# Patient Record
Sex: FEMALE | Race: WHITE
[De-identification: names, ages, dates, MRNs, and addresses within clinical notes are randomized per-mention and may not be internally consistent; named-entity substitution may affect disease eponyms.]

---

## 2017-05-30 ENCOUNTER — HOSPITAL ENCOUNTER (OUTPATIENT)
Dept: HOSPITAL 62 - ER | Age: 47
Setting detail: OBSERVATION
LOS: 1 days | Discharge: HOME | End: 2017-05-31
Attending: FAMILY MEDICINE | Admitting: FAMILY MEDICINE
Payer: COMMERCIAL

## 2017-05-30 DIAGNOSIS — F10.10: ICD-10-CM

## 2017-05-30 DIAGNOSIS — Z85.828: ICD-10-CM

## 2017-05-30 DIAGNOSIS — R11.2: ICD-10-CM

## 2017-05-30 DIAGNOSIS — Z80.9: ICD-10-CM

## 2017-05-30 DIAGNOSIS — R19.7: ICD-10-CM

## 2017-05-30 DIAGNOSIS — E87.1: Primary | ICD-10-CM

## 2017-05-30 DIAGNOSIS — F14.90: ICD-10-CM

## 2017-05-30 DIAGNOSIS — E87.6: ICD-10-CM

## 2017-05-30 DIAGNOSIS — F17.290: ICD-10-CM

## 2017-05-30 DIAGNOSIS — Z87.891: ICD-10-CM

## 2017-05-30 DIAGNOSIS — R79.89: ICD-10-CM

## 2017-05-30 DIAGNOSIS — D64.9: ICD-10-CM

## 2017-05-30 DIAGNOSIS — Z83.79: ICD-10-CM

## 2017-05-30 DIAGNOSIS — R91.8: ICD-10-CM

## 2017-05-30 LAB
ALBUMIN SERPL-MCNC: 3.6 G/DL (ref 3.5–5)
ALP SERPL-CCNC: 49 U/L (ref 38–126)
ALT SERPL-CCNC: 42 U/L (ref 9–52)
ANION GAP SERPL CALC-SCNC: 11 MMOL/L (ref 5–19)
AST SERPL-CCNC: 43 U/L (ref 14–36)
BASOPHILS # BLD AUTO: 0.1 10^3/UL (ref 0–0.2)
BASOPHILS NFR BLD AUTO: 0.4 % (ref 0–2)
BILIRUB DIRECT SERPL-MCNC: 0.2 MG/DL (ref 0–0.4)
BILIRUB SERPL-MCNC: 0.6 MG/DL (ref 0.2–1.3)
BUN SERPL-MCNC: 4 MG/DL (ref 7–20)
CALCIUM: 8 MG/DL (ref 8.4–10.2)
CHLORIDE SERPL-SCNC: 84 MMOL/L (ref 98–107)
CO2 SERPL-SCNC: 23 MMOL/L (ref 22–30)
CREAT SERPL-MCNC: 0.62 MG/DL (ref 0.52–1.25)
EOSINOPHIL # BLD AUTO: 0.1 10^3/UL (ref 0–0.6)
EOSINOPHIL NFR BLD AUTO: 0.8 % (ref 0–6)
ERYTHROCYTE [DISTWIDTH] IN BLOOD BY AUTOMATED COUNT: 17.3 % (ref 11.5–14)
ETHANOL SERPL-MCNC: < 10 MG/DL
GLUCOSE SERPL-MCNC: 79 MG/DL (ref 75–110)
HCT VFR BLD CALC: 28.6 % (ref 36–47)
HGB BLD-MCNC: 8.9 G/DL (ref 12–15.5)
HGB HCT DIFFERENCE: -1.9
LIPASE SERPL-CCNC: 69 U/L (ref 23–300)
LYMPHOCYTES # BLD AUTO: 1.1 10^3/UL (ref 0.5–4.7)
LYMPHOCYTES NFR BLD AUTO: 8.8 % (ref 13–45)
MCH RBC QN AUTO: 21.2 PG (ref 27–33.4)
MCHC RBC AUTO-ENTMCNC: 31.3 G/DL (ref 32–36)
MCV RBC AUTO: 68 FL (ref 80–97)
MONOCYTES # BLD AUTO: 1.2 10^3/UL (ref 0.1–1.4)
MONOCYTES NFR BLD AUTO: 9.3 % (ref 3–13)
NEUTROPHILS # BLD AUTO: 10.5 10^3/UL (ref 1.7–8.2)
NEUTS SEG NFR BLD AUTO: 80.7 % (ref 42–78)
POTASSIUM SERPL-SCNC: 3.5 MMOL/L (ref 3.6–5)
PROT SERPL-MCNC: 6 G/DL (ref 6.3–8.2)
RBC # BLD AUTO: 4.23 10^6/UL (ref 3.72–5.28)
SODIUM SERPL-SCNC: 118 MMOL/L (ref 137–145)
WBC # BLD AUTO: 13 10^3/UL (ref 4–10.5)

## 2017-05-30 PROCEDURE — 83540 ASSAY OF IRON: CPT

## 2017-05-30 PROCEDURE — 85025 COMPLETE CBC W/AUTO DIFF WBC: CPT

## 2017-05-30 PROCEDURE — 83930 ASSAY OF BLOOD OSMOLALITY: CPT

## 2017-05-30 PROCEDURE — 84300 ASSAY OF URINE SODIUM: CPT

## 2017-05-30 PROCEDURE — 84703 CHORIONIC GONADOTROPIN ASSAY: CPT

## 2017-05-30 PROCEDURE — 85045 AUTOMATED RETICULOCYTE COUNT: CPT

## 2017-05-30 PROCEDURE — G0378 HOSPITAL OBSERVATION PER HR: HCPCS

## 2017-05-30 PROCEDURE — 83655 ASSAY OF LEAD: CPT

## 2017-05-30 PROCEDURE — 93010 ELECTROCARDIOGRAM REPORT: CPT

## 2017-05-30 PROCEDURE — 80048 BASIC METABOLIC PNL TOTAL CA: CPT

## 2017-05-30 PROCEDURE — 85730 THROMBOPLASTIN TIME PARTIAL: CPT

## 2017-05-30 PROCEDURE — 83935 ASSAY OF URINE OSMOLALITY: CPT

## 2017-05-30 PROCEDURE — 84443 ASSAY THYROID STIM HORMONE: CPT

## 2017-05-30 PROCEDURE — 85610 PROTHROMBIN TIME: CPT

## 2017-05-30 PROCEDURE — 84466 ASSAY OF TRANSFERRIN: CPT

## 2017-05-30 PROCEDURE — 83550 IRON BINDING TEST: CPT

## 2017-05-30 PROCEDURE — 96375 TX/PRO/DX INJ NEW DRUG ADDON: CPT

## 2017-05-30 PROCEDURE — 74177 CT ABD & PELVIS W/CONTRAST: CPT

## 2017-05-30 PROCEDURE — 82272 OCCULT BLD FECES 1-3 TESTS: CPT

## 2017-05-30 PROCEDURE — 93005 ELECTROCARDIOGRAM TRACING: CPT

## 2017-05-30 PROCEDURE — 80307 DRUG TEST PRSMV CHEM ANLYZR: CPT

## 2017-05-30 PROCEDURE — 81001 URINALYSIS AUTO W/SCOPE: CPT

## 2017-05-30 PROCEDURE — 87086 URINE CULTURE/COLONY COUNT: CPT

## 2017-05-30 PROCEDURE — 82746 ASSAY OF FOLIC ACID SERUM: CPT

## 2017-05-30 PROCEDURE — 83735 ASSAY OF MAGNESIUM: CPT

## 2017-05-30 PROCEDURE — 82728 ASSAY OF FERRITIN: CPT

## 2017-05-30 PROCEDURE — 94799 UNLISTED PULMONARY SVC/PX: CPT

## 2017-05-30 PROCEDURE — 83690 ASSAY OF LIPASE: CPT

## 2017-05-30 PROCEDURE — 96365 THER/PROPH/DIAG IV INF INIT: CPT

## 2017-05-30 PROCEDURE — 80053 COMPREHEN METABOLIC PANEL: CPT

## 2017-05-30 PROCEDURE — 99285 EMERGENCY DEPT VISIT HI MDM: CPT

## 2017-05-30 PROCEDURE — 82607 VITAMIN B-12: CPT

## 2017-05-30 PROCEDURE — 36415 COLL VENOUS BLD VENIPUNCTURE: CPT

## 2017-05-30 PROCEDURE — 96361 HYDRATE IV INFUSION ADD-ON: CPT

## 2017-05-30 NOTE — ER DOCUMENT REPORT
ED GI/





- General


Chief Complaint: Dizziness


Stated Complaint: DIZZY/NAUSEA/VOMITING


Time Seen by Provider: 17 22:05


Mode of Arrival: Ambulatory


Information source: Patient


TRAVEL OUTSIDE OF THE [place] IN LAST 30 DAYS: No





- HPI


Patient complains to provider of: Other - nausea


Onset: This evening


Timing/Duration: Sudden


Quality of pain: No pain


Associated symptoms: Nausea


Exacerbated by: Denies


Relieved by: Denies


Similar symptoms previously: No


Recently seen / treated by doctor: No


Notes: 





17 03:13


It is a 47-year-old female who presents to the emergency room complaining of 

nausea and vomiting that started this evening, she states she has not had a 

bowel movement since last Thursday, therefore she drinks some prune juice this 

evening and then took benzonite which is an herbal supplement, stating she took 

a little over a teaspoon of this medication, shortly thereafter she developed 

vomiting and nausea, denies any abdominal pain, no fever, no history of similar 

symptoms, no dysuria or hematuria, reports that she does not take the benzonite 

on a regular basis, this was the first time she has taken it, her last bowel 

movement was normal and without blood, vomiting also without blood, she denies 

any history of GI bleeding, no sick contacts, she reports drinking 

approximately 4 bottles of water on a daily basis, denies alcohol intake, she 

quit smoking in January and started vaping patient does not currently take any 

medication, has a history of a  in , no other abdominal surgery





- Related Data


Allergies/Adverse Reactions: 


 





bacitracin [From Neosporin ([id number])] Allergy (Verified 17 02:10)


 


neomycin [From Neosporin ([id number])] Allergy (Verified 17 02:10)


 


polymyxin B [From Neosporin ([id number])] Allergy (Verified 17 02:10)


 











Past Medical History





- General


Information source: Patient





- Social History


Smoking Status: Current Every Day Smoker - Vape


Frequency of alcohol use: Occasional


Family History: Reviewed & Not Pertinent





Review of Systems





- Review of Systems


Constitutional: No symptoms reported


EENT: No symptoms reported


Cardiovascular: No symptoms reported


Respiratory: No symptoms reported


Gastrointestinal: See HPI


Genitourinary: No symptoms reported


Female Genitourinary: No symptoms reported


Musculoskeletal: No symptoms reported


Skin: No symptoms reported


Hematologic/Lymphatic: No symptoms reported


Neurological/Psychological: No symptoms reported


-: Yes All other systems reviewed and negative





Physical Exam





- Vital signs


Vitals: 


 











Resp Pulse Ox


 


 16   100 


 


 17 21:55  17 21:55











Interpretation: Normal





- General


General appearance: Appears well, Alert





- HEENT


Head: Normocephalic, Atraumatic


Eyes: Normal


Pupils: PERRL





- Respiratory


Respiratory status: No respiratory distress


Chest status: Nontender


Breath sounds: Normal


Chest palpation: Normal





- Cardiovascular


Rhythm: Regular


Heart sounds: Normal auscultation


Murmur: No





- Abdominal


Inspection: Normal


Distension: No distension


Bowel sounds: Normal


Tenderness: Nontender


Organomegaly: No organomegaly





- Rectal


Tenderness: No


Stool: No: Bloody


Hemorrhoids: None





- Back


Back: Normal, Nontender





- Extremities


General upper extremity: Normal inspection, Nontender, Normal color, Normal ROM

, Normal temperature


General lower extremity: Normal inspection, Nontender, Normal color, Normal ROM

, Normal temperature, Normal weight bearing.  No: Linda's sign





- Neurological


Neuro grossly intact: Yes


Cognition: Normal


Orientation: AAOx4


Sander Coma Scale Eye Opening: Spontaneous


Sander Coma Scale Verbal: Oriented


[place] Coma Scale Motor: Obeys Commands


Sander Coma Scale Total: 15


Speech: Normal


Motor strength normal: LUE, RUE, LLE, RLE


Sensory: Normal





- Psychological


Associated symptoms: Normal affect, Normal mood





- Skin


Skin Temperature: Warm


Skin Moisture: Dry


Skin Color: Normal





Course





- Re-evaluation


Re-evalutation: 


17 23:48


call to poison control, spoke with Anjana, discussed patient's use of benzonite, 

which if used chronically can cause hypokalemia, or intestinal obstruction 

likely not the source of patient's hyponatremia





Findings were discussed with patient at bedside as well as imaging, observation 

admission was recommended and patient is in agreement with this





Was discussed with hospitalist who agrees to admit for further evaluation and 

treatment








- Vital Signs


Vital signs: 


 











Temp Pulse Resp BP Pulse Ox


 


 98.4 F      18   112/77   96 


 


 17 22:19     17 01:30  17 01:30  17 01:30














- Laboratory


Result Diagrams: 


 17 23:06





 17 23:06


Laboratory results interpreted by me: 


 











  17





  23:06 23:06 23:06


 


WBC  13.0 H  


 


Hgb  8.9 L  


 


Hct  28.6 L  


 


MCV  68 L  


 


MCH  21.2 L  


 


MCHC  31.3 L  


 


RDW  17.3 H  


 


Seg Neutrophils %  80.7 H  


 


Lymphocytes %  8.8 L  


 


Absolute Neutrophils  10.5 H  


 


Sodium   118.0 L* 


 


Potassium   3.5 L 


 


Chloride   84 L 


 


BUN   4 L 


 


Calcium   8.0 L 


 


AST   43 H 


 


Total Protein   6.0 L 


 


Urine Glucose (UA)    50 H


 


Urine Ketones    20 H














- Diagnostic Test


Radiology reviewed: Image reviewed, Reports reviewed





- Transfer of Care


Care transferred to following provider: Dr. Oliver





Discharge





- Discharge


Clinical Impression: 


 Hyponatremia





Nausea and vomiting


Qualifiers:


 Vomiting type: unspecified Vomiting Intractability: non-intractable Qualified 

Code(s): R11.2 - Nausea with vomiting, unspecified





Pneumonia


Qualifiers:


 Pneumonia type: due to unspecified organism Laterality: right Lung location: 

middle lobe of lung Qualified Code(s): J18.1 - Lobar pneumonia, unspecified 

organism





Admitting Provider: Hospitalist


Unit Admitted: Telemetry


Referrals: 


KAYLAN READ MD [Primary Care Provider] - Follow up as needed

## 2017-05-31 VITALS — SYSTOLIC BLOOD PRESSURE: 98 MMHG | DIASTOLIC BLOOD PRESSURE: 60 MMHG

## 2017-05-31 LAB
ALBUMIN SERPL-MCNC: 3.4 G/DL (ref 3.5–5)
ALP SERPL-CCNC: 49 U/L (ref 38–126)
ALT SERPL-CCNC: 40 U/L (ref 9–52)
ANION GAP SERPL CALC-SCNC: 10 MMOL/L (ref 5–19)
ANION GAP SERPL CALC-SCNC: 8 MMOL/L (ref 5–19)
APPEARANCE UR: CLEAR
APTT BLD: 29.7 SEC (ref 23.5–35.8)
AST SERPL-CCNC: 44 U/L (ref 14–36)
BARBITURATES UR QL SCN: NEGATIVE
BASOPHILS # BLD AUTO: 0.1 10^3/UL (ref 0–0.2)
BASOPHILS NFR BLD AUTO: 0.6 % (ref 0–2)
BILIRUB DIRECT SERPL-MCNC: 0.2 MG/DL (ref 0–0.4)
BILIRUB SERPL-MCNC: 0.6 MG/DL (ref 0.2–1.3)
BILIRUB UR QL STRIP: NEGATIVE
BUN SERPL-MCNC: 2 MG/DL (ref 7–20)
BUN SERPL-MCNC: 3 MG/DL (ref 7–20)
CALCIUM: 8.3 MG/DL (ref 8.4–10.2)
CALCIUM: 8.7 MG/DL (ref 8.4–10.2)
CHLORIDE SERPL-SCNC: 94 MMOL/L (ref 98–107)
CHLORIDE SERPL-SCNC: 99 MMOL/L (ref 98–107)
CO2 SERPL-SCNC: 24 MMOL/L (ref 22–30)
CO2 SERPL-SCNC: 26 MMOL/L (ref 22–30)
CREAT SERPL-MCNC: 0.62 MG/DL (ref 0.52–1.25)
CREAT SERPL-MCNC: 0.65 MG/DL (ref 0.52–1.25)
EOSINOPHIL # BLD AUTO: 0.2 10^3/UL (ref 0–0.6)
EOSINOPHIL NFR BLD AUTO: 1.9 % (ref 0–6)
ERYTHROCYTE [DISTWIDTH] IN BLOOD BY AUTOMATED COUNT: 17.1 % (ref 11.5–14)
FERRITIN SERPL-MCNC: 9.16 NG/ML (ref 6.2–137)
FOLATE SERPL-MCNC: 11.9 NG/ML (ref 2.76–?)
GLUCOSE SERPL-MCNC: 80 MG/DL (ref 75–110)
GLUCOSE SERPL-MCNC: 85 MG/DL (ref 75–110)
GLUCOSE UR STRIP-MCNC: 50 MG/DL
HCT VFR BLD CALC: 28.5 % (ref 36–47)
HGB BLD-MCNC: 9.3 G/DL (ref 12–15.5)
HGB HCT DIFFERENCE: -0.6
KETONES UR STRIP-MCNC: 20 MG/DL
LYMPHOCYTES # BLD AUTO: 1 10^3/UL (ref 0.5–4.7)
LYMPHOCYTES NFR BLD AUTO: 9.5 % (ref 13–45)
MAGNESIUM SERPL-MCNC: 1.6 MG/DL (ref 1.6–2.3)
MCH RBC QN AUTO: 21.6 PG (ref 27–33.4)
MCHC RBC AUTO-ENTMCNC: 32.6 G/DL (ref 32–36)
MCV RBC AUTO: 66 FL (ref 80–97)
METHADONE UR QL SCN: NEGATIVE
MONOCYTES # BLD AUTO: 1.3 10^3/UL (ref 0.1–1.4)
MONOCYTES NFR BLD AUTO: 12 % (ref 3–13)
NEUTROPHILS # BLD AUTO: 8 10^3/UL (ref 1.7–8.2)
NEUTS SEG NFR BLD AUTO: 76 % (ref 42–78)
NITRITE UR QL STRIP: NEGATIVE
PCP UR QL SCN: NEGATIVE
PH UR STRIP: 6 [PH] (ref 5–9)
POTASSIUM SERPL-SCNC: 3.6 MMOL/L (ref 3.6–5)
POTASSIUM SERPL-SCNC: 3.6 MMOL/L (ref 3.6–5)
PROT SERPL-MCNC: 5.8 G/DL (ref 6.3–8.2)
PROT UR STRIP-MCNC: NEGATIVE MG/DL
PROTHROMBIN TIME: 13.7 SEC (ref 11.4–15.4)
RBC # BLD AUTO: 4.29 10^6/UL (ref 3.72–5.28)
SODIUM SERPL-SCNC: 127.6 MMOL/L (ref 137–145)
SODIUM SERPL-SCNC: 132.6 MMOL/L (ref 137–145)
SP GR UR STRIP: 1
URINE OPIATES LOW: NEGATIVE
UROBILINOGEN UR-MCNC: NEGATIVE MG/DL (ref ?–2)
VIT B12 SERPL-MCNC: 691 PG/ML (ref 239–931)
WBC # BLD AUTO: 10.5 10^3/UL (ref 4–10.5)

## 2017-05-31 NOTE — PDOC H&P
History of Present Illness


Admission Date/PCP: 


  17 03:28





  KAYLAN READ MD





Patient complains of: nause/vomiting/diarrhea


History of Present Illness: 


BRENDA SINGH is a 47 year old  female with a history of anxiety, 

admittedly drinking 2 beers a day, and approximately 3 32-ounce containers of 

water per day, in the form of water itself, along with tea and coffee, who 

presents to the emergency room for evaluation of above complaints.





Patient has been discussed with emergency room physician who evaluated the 

patient. 





This morning, approximately 10 AM, she took prune juice and approximately a 

tablespoon full of benzonite, an over-the-counter herbal medication, for a 3 

day history of constipation.





Starting approximately noon, she developed multiple episodes of nonbloody non-

coffee-ground emesis, with subsequent development of diarrhea later that 

afternoon.  No blood from below, either.





According to a female EMS technician in the room, with patient's approval, 

patient was noted to have approximately a "3 gallon trash can" full of emesis 

at her home.





Associated cramping abdominal pain, but no pain at present.  no chest pain.  No 

fever or chills.  No cough.





Has taken this benzonite only once before in the distant past, approximately 

half the above dose.  No such problems at that time.





Emergency room physician did speak with [place] poison control 

concerning this medication.  Was told if used chronically, can cause 

hypokalemia or intestinal obstruction.





No known history of hyponatremia.





Initially denied illicit drug use, but did admit to "trying" cocaine 2 days ago.





Currently resting quietly, complaining primarily of being tired.


 


 


 


Laboratory results are listed in [employer] and are reviewed.  No prior chemistry 

results available for review.


 


 


X-ray summary results are listed below, with full report(s) reviewed.


 


 


 


EKG pending.


 


 


 


Social history/personal habits: .  Has children.  Currently is a 

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  Personal habits as noted above.  Stopped 

smoking cigarettes in January, but now vapes.


 


 


 


Allergies/adverse reactions are listed in [employer] and are reviewed. 


 


 


Home medications consist only of a multivitamin.


 





REVIEW OF SYSTEMS: 


 


Constitutional: No fever or chills.


 


Eyes: Wears glasses.  


 


ENT: No swallowing problems or complaints.  Denies hearing loss.


 


Pulmonary: No current complaints.


 


Cardiovascular: No current complaints, including chest pain.


 


Gastrointestinal: See history and present illness.


 


Skin: No current complaints, including rashes.


 


Hematologic: Denies easy bruising.


 


Neurologic: No current complaints, including numbness or tingling.


 


Musculoskeletal: No current or chronic joint complaints, such as arthritis.


 


Psychiatric: History of anxiety; no current complaints of same, or depression.


 


Endocrine: No current complaints, including polyuria.


 


Genitourinary: No current complaints, including dysuria.


 


 


PHYSICAL EXAMINATION:


 


5 feet 3 inches tall.  65.8 kg.  BMI 25.7 kg/m.  Pulse 78 and regular.  Blood 

pressure 115/77.  Respirations are 15 and unlabored.  100% saturation on room 

air.  Temperature 98.4.





Slightly overweight otherwise well-nourished well-developed  female 

appearing approximately her stated age.  Pleasant awake alert and cooperative.  

No obvious distress other than perhaps mildly anxious.  Also appears perhaps 

slightly fatigued.





Female EMS technician is present for a brief time when I initially walk in the 

room.  Female emergency room nurse Lula is present.


 


Skin is warm and dry.  No grossly obvious evidence of rash in areas of skin 

examined.  No subcutaneous nodules palpated.


 


ENT: Hearing grossly normal to normal conversation.  Tongue midline on 

protrusion pink and slightly tacky.


 


Eyes: No scleral icterus.  Pupils equal and reactive to light at 4 mm.  Pink 

conjunctivae.


 


Neck is supple and nontender to gentle active range of motion and palpation.  

Midline trachea.  No palpable thyroid nodule mass enlargement or tenderness.


 


Lymphatic: No palpable cervical or clavicular nodes.


 


Neck and lymphatic exams limited by patient body habitus.


 


Psychiatric: Reasonable insight into acute and chronic medical issues.  

Oriented to time location and why here.


 


Lungs: Auscultation reveals clear and equal breath sounds bilaterally.  No use 

of accessory respiratory muscles.


 


Cardiovascular: Heart regular rate and rhythm, without gallop murmur or rub.  

No carotid or abdominal aortic bruits. No ankle or pedal edema.  Palpable 

dorsalis pedis pulses.


 


Abdomen:soft slightly distended nontender with positive bowel sounds.  Unable 

to adequately evaluate abdomen for masses or organomegaly due to distention. 


 


Extremities: Hands and feet are warm and dry.  No calf tenderness to 

compression.  No grossly obvious visual evidence of upper extremity or calf 

swelling.  Gentle manipulation of upper and lower extremities fails to reveal 

any obvious evidence of injury or instability to involved major joints.


 


Neurologic: Light touch intact at feet.  Motor function of major muscle groups 

upper and lower extremities 5 over 5 and symmetric.  Patellar reflexes absent.  

Absent Babinski.  No facial droop.  No nystagmus.  No rigidity.


 











Past Medical History


Cardiac Medical History: 


   Denies: Congestive Heart Failure, DVT, Myocardial Infarction, Hyperlipidema, 

Hypertension, Pulmonary Embolism


Pulmonary Medical History: 


   Denies: Asthma, Chronic Obstructive Pulmonary Disease (COPD), Sleep Apnea


EENT Medical History: Reports: Eyes - Glasses


   Denies: Ears, Throat


Neurological Medical History: 


   Denies: Hemorrhagic CVA, Ischemic CVA, Seizures


Endocrine Medical History: 


   Denies: Diabetes Mellitus Type 1, Diabetes Mellitus Type 2, Hyperthyroidism, 

Hypothyroidism


Renal/ Medical History: Reports: None


Malignancy Medical History: Reports: Skin Cancer - Previous excision of basal 

cell carcinoma.


GI Medical History: Reports: Gastroesophageal Reflux Disease - Possible 

considering patient's description of symptoms.


   Denies: Cirrhosis, Hepatitis, Peptic Ulcer Disease


Musculoskeltal Medical History: 


   Denies: Arthritis


Skin Medical History: Reports: Other - Previous excision of basal cell 

carcinoma.


Psychiatric Medical History: Reports: General Anxiety Disorder - History of same

; denies current complaints., Substance Abuse, Tobacco Dependency


   Denies: Alcohol Dependency - Averages 2 beers a day., Depression


Hematology: Reports: Anemia - Her review of prior labs.


Infectious Medical History: 


   Denies: Clostridium Difficile, Hepatitis B, Hepatitis C, Methicillin-

Resistant Staph Aureus





Past Surgical History


Past Surgical History: Reports:  Section, Other - Excision of basal 

cell carcinoma skin





Social History


Information Source: Patient, Emergency Med Personnel, [facility] Records


Smoking Status: Current Every Day Smoker - Vape


Frequency of Alcohol Use: Social


Drugs: Cocaine





- Advance Directive


Resuscitation Status: Full Code


Surrogate healthcare decision maker:: 


Mother





Family History


Family History: Reviewed & Not Pertinent


Parental Family History Reviewed: Yes - Mother with Crohn's disease.  Father 

 of cancer.


Children Family History Reviewed: Yes - Healthy


Sibling(s) Family History Reviewed.: Yes - Healthy





Medication/Allergy


Home Medications: 








No Home Medications  17 








Allergies/Adverse Reactions: 


 





bacitracin [From Neosporin ([id number])] Allergy (Verified 17 02:10)


 


neomycin [From Neosporin ([id number])] Allergy (Verified 17 02:10)


 


polymyxin B [From Neosporin ([id number]-dme-[id number])] Allergy (Verified 17 02:10)


 











Physical Exam


Vital Signs: 


 











Temp Pulse Resp BP Pulse Ox


 


 98.4 F      15   129/70 H  100 


 


 17 22:19     17 04:03  17 04:03  17 04:03














Results


Laboratory Results: 


 





 17 03:53 





 











  17





  03:53


 


Sodium  127.6 L


 


Potassium  3.6


 


Chloride  94 L


 


Carbon Dioxide  24


 


Anion Gap  10


 


BUN  3 L


 


Creatinine  0.62


 


Est GFR ( Amer)  > 60


 


Est GFR (Non-Af Amer)  > 60


 


Glucose  80


 


Calcium  8.3 L


 


Magnesium  1.6


 


Total Bilirubin  0.6


 


AST  44 H


 


ALT  40


 


Alkaline Phosphatase  49


 


Total Protein  5.8 L


 


Albumin  3.4 L











Impressions: 


 





Abdomen/Pelvis CT  17 23:52


IMPRESSION:  1.  Small free pelvic fluid.  2.  Possible small right middle 

lobar pneumonia.


 














Assessment & Plan





- Diagnosis


(1) Abnormal chest x-ray


Is this a current diagnosis for this admission?: YesPlan: 


Has received a dose of Rocephin by emergency room physician.  Due to lack of 

symptoms, we will forego further antibiotics, but will institute incentive 

spirometry twice daily.  Follow-up CBC with differential.








(2) Cocaine use


Is this a current diagnosis for this admission?: Yes





(3) Elevated LFTs


Is this a current diagnosis for this admission?: YesPlan: 


Repeat chemistry.








(4) Hypokalemia


Is this a current diagnosis for this admission?: YesPlan: 


Repeat chemistry.








(5) Hyponatremia


Is this a current diagnosis for this admission?: YesPlan: 





Suspect an element of chronic hyponatremia, given patient's alcohol and water 

use; no prior chemistry values for comparison.  Suspect also impacted by her 

vomiting, and perhaps even diarrhea.  Follow-up chemistry.  Appropriate IV 

fluids if needed.





I have strongly encouraged patient not to get out of bed without notifying staff

, to avoid a fall with injury.


 


Knee high SCDs for DVT prophylaxis, along with subcutaneous Lovenox.





Impression and plans were discussed with patient who concurs.


 


Time spent in evaluation and management of patient: 61 minutes.








(6) Nausea vomiting and diarrhea


Is this a current diagnosis for this admission?: YesPlan: 


Suspect due in part to the herbal medication she took.  No current GI tract 

complaints.








(7) Anemia


Qualifiers: 


     Anemia type: unspecified type        Qualified Code(s): D64.9 - Anemia, 

unspecified  


Is this a current diagnosis for this admission?: YesPlan: 


Basic anemia screening labs.  Follow-up CBC with differential.  PT/INR and PTT.

  Likely can be further evaluated as outpatient.

## 2017-05-31 NOTE — PDOC DISCHARGE SUMMARY
General





- Admit/Disc Date/PCP


Admission Date/Primary Care Provider: 


  05/31/17 04:20





  KAYLAN READ MD





Discharge Date: 05/31/17





- Discharge Diagnosis


(1) Hyponatremia


Is this a current diagnosis for this admission?: YesSummary: 


Patient was found to have hyponatremia.  She had low urine osmolality and low 

serum osmolality.  She was advised to discontinue alcohol intake and to limit 

total fluid intake to 1.5-2 L daily.  Sodium was slightly low but stable at 

time of discharge.  She will need to follow-up with her primary care provider.








(2) Alcohol abuse


Is this a current diagnosis for this admission?: Yes





(3) Cocaine use


Is this a current diagnosis for this admission?: Yes





(4) Hypokalemia


Is this a current diagnosis for this admission?: YesSummary: 


Patient had mild hypokalemia on admission.  This has now been corrected.








(5) Nausea and vomiting


Is this a current diagnosis for this admission?: Yes





(6) Anemia


Is this a current diagnosis for this admission?: YesSummary: 





Patient was found to be anemic on this admission.  She was Hemoccult negative.  

Hemoglobin was low but stable.  She was advised to discontinue alcohol intake.  

This will need further outpatient follow-up by her primary care provider.











- Additional Information


Resuscitation Status: Full Code


Discharge Diet: Regular


Discharge Activity: Activity As Tolerated


Home Medications: 








No Home Medications  05/31/17 











History of Present Illness


Patient complains of: Nausea and vomiting


History of Present Illness: 


BRENDA SINGH is a 47 year old  female with a history of anxiety, 

admittedly drinking 2 beers a day, and approximately 3 32-ounce containers of 

water per day, in the form of water itself, along with tea and coffee, who 

presents to the emergency room for evaluation of above complaints.








Hospital Course


Hospital Course: 


See above





Physical Exam


Vital Signs: 


 











Temp Pulse Resp BP Pulse Ox


 


 98.1 F   73   16   102/59 L  98 


 


 05/31/17 07:44  05/31/17 07:44  05/31/17 07:44  05/31/17 07:44  05/31/17 07:44








GENERAL: No acute distress


HEENT: Conjunctiva clear, nonicteric, moist mucous membranes, no JVD, midline 

trachea


RESPIRATORY: Clear to auscultation bilaterally, no wheezes, no rhonchi


CARDIAC: Regular rate and rhythm, no murmurs/gallops/rubs


ABDOMEN: Soft, nondistended, nontender, positive bowel sounds, no rebound, no 

guarding


EXTREMETIES: No edema, cyanosis, clubbing


NEUROLOGIC: Alert, oriented to person/place/time, CN's grossly intact,  no 

focal deficits


SKIN: No rash, wounds


PSYCH: Normal mood, normal affect








Results


Laboratory Results: 


 





 05/31/17 06:37 





 05/31/17 06:37 





 











  05/31/17 05/31/17





  06:37 06:37


 


WBC  10.5 


 


RBC  4.29 


 


Hgb  9.3 L 


 


Hct  28.5 L 


 


MCV  66 L 


 


MCH  21.6 L 


 


MCHC  32.6 


 


RDW  17.1 H 


 


Plt Count  396 


 


Seg Neutrophils %  76.0 


 


Lymphocytes %  9.5 L 


 


Monocytes %  12.0 


 


Eosinophils %  1.9 


 


Basophils %  0.6 


 


Absolute Neutrophils  8.0 


 


Absolute Lymphocytes  1.0 


 


Absolute Monocytes  1.3 


 


Absolute Eosinophils  0.2 


 


Absolute Basophils  0.1 


 


Retic Count (auto)  2.17 


 


Absolute Retic  0.093 


 


Sodium   132.6 L


 


Potassium   3.6


 


Chloride   99


 


Carbon Dioxide   26


 


Anion Gap   8


 


BUN   2 L


 


Creatinine   0.65


 


Est GFR ( Amer)   > 60


 


Est GFR (Non-Af Amer)   > 60


 


Glucose   85


 


Calcium   8.7


 


Iron   < 10.1 L


 


TIBC   291


 


% Saturation   UNABLE TO CALCULATE


 


Ferritin   9.16


 


Vitamin B12   691.0


 


Folate   11.90








 Labs- Last Values











WBC  10.5 10^3/uL (4.0-10.5)   05/31/17  06:37    


 


RBC  4.29 10^6/uL (3.72-5.28)   05/31/17  06:37    


 


Hgb  9.3 g/dL (12.0-15.5)  L  05/31/17  06:37    


 


Hct  28.5 % (36.0-47.0)  L  05/31/17  06:37    


 


MCV  66 fl (80-97)  L  05/31/17  06:37    


 


MCH  21.6 pg (27.0-33.4)  L  05/31/17  06:37    


 


MCHC  32.6 g/dL (32.0-36.0)   05/31/17  06:37    


 


RDW  17.1 % (11.5-14.0)  H  05/31/17  06:37    


 


Plt Count  396 10^3/uL (150-450)   05/31/17  06:37    


 


Seg Neutrophils %  76.0 % (42-78)   05/31/17  06:37    


 


Lymphocytes %  9.5 % (13-45)  L  05/31/17  06:37    


 


Monocytes %  12.0 % (3-13)   05/31/17  06:37    


 


Eosinophils %  1.9 % (0-6)   05/31/17  06:37    


 


Basophils %  0.6 % (0-2)   05/31/17  06:37    


 


Absolute Neutrophils  8.0 10^3/uL (1.7-8.2)   05/31/17  06:37    


 


Absolute Lymphocytes  1.0 10^3/uL (0.5-4.7)   05/31/17  06:37    


 


Absolute Monocytes  1.3 10^3/uL (0.1-1.4)   05/31/17  06:37    


 


Absolute Eosinophils  0.2 10^3/uL (0.0-0.6)   05/31/17  06:37    


 


Absolute Basophils  0.1 10^3/uL (0.0-0.2)   05/31/17  06:37    


 


Retic Count (auto)  2.17 % (0.66-2.85)   05/31/17  06:37    


 


Absolute Retic  0.093 10^6/uL (0.028-0.122)   05/31/17  06:37    


 


PT  13.7 SEC (11.4-15.4)   05/31/17  06:37    


 


INR  0.98   05/31/17  06:37    


 


APTT  29.7 SEC (23.5-35.8)   05/31/17  06:37    


 


Sodium  132.6 mmol/L (137-145)  L  05/31/17  06:37    


 


Potassium  3.6 mmol/L (3.6-5.0)   05/31/17  06:37    


 


Chloride  99 mmol/L ()   05/31/17  06:37    


 


Carbon Dioxide  26 mmol/L (22-30)   05/31/17  06:37    


 


Anion Gap  8  (5-19)   05/31/17  06:37    


 


BUN  2 mg/dL (7-20)  L  05/31/17  06:37    


 


Creatinine  0.65 mg/dL (0.52-1.25)   05/31/17  06:37    


 


Est GFR ( Amer)  > 60  (>60)   05/31/17  06:37    


 


Est GFR (Non-Af Amer)  > 60  (>60)   05/31/17  06:37    


 


Glucose  85 mg/dL ()   05/31/17  06:37    


 


Serum Osmolality  265 mOsm/kg (275-301)  L  05/31/17  03:53    


 


Calcium  8.7 mg/dL (8.4-10.2)   05/31/17  06:37    


 


Magnesium  1.6 mg/dL (1.6-2.3)   05/31/17  03:53    


 


Iron  < 10.1 ug/dL ()  L  05/31/17  06:37    


 


TIBC  291 ug/dL (250-450)   05/31/17  06:37    


 


% Saturation  UNABLE TO CALCULATE % (15% - 50%)   05/31/17  06:37    


 


Ferritin  9.16 ng/mL (6.2-137.0)   05/31/17  06:37    


 


Total Bilirubin  0.6 mg/dL (0.2-1.3)   05/31/17  03:53    


 


Direct Bilirubin  0.2 mg/dL (0.0-0.4)   05/31/17  03:53    


 


Indirect Bilirubin  Not Reportable   05/31/17  03:53    


 


Neonat Total Bilirubin  Not Reportable   05/31/17  03:53    


 


AST  44 U/L (14-36)  H  05/31/17  03:53    


 


ALT  40 U/L (9-52)   05/31/17  03:53    


 


Alkaline Phosphatase  49 U/L ()   05/31/17  03:53    


 


Total Protein  5.8 g/dL (6.3-8.2)  L  05/31/17  03:53    


 


Albumin  3.4 g/dL (3.5-5.0)  L  05/31/17  03:53    


 


Lipase  69.0 U/L ()   05/30/17  23:06    


 


Vitamin B12  691.0 pg/mL (239-931)   05/31/17  06:37    


 


Folate  11.90 ng/mL (>2.76)   05/31/17  06:37    


 


TSH  1.82 uIU/mL (0.47-4.68)   05/31/17  03:53    


 


Serum HCG, Qual  NEGATIVE  (NEGATIVE)   05/30/17  23:06    


 


Urine Color  COLORLESS   05/30/17  23:06    


 


Urine Appearance  CLEAR   05/30/17  23:06    


 


Urine pH  6.0  (5.0-9.0)   05/30/17  23:06    


 


Ur Specific Gravity  1.003   05/30/17  23:06    


 


Urine Protein  NEGATIVE mg/dL (NEGATIVE)   05/30/17  23:06    


 


Urine Glucose (UA)  50 mg/dL (NEGATIVE)  H  05/30/17  23:06    


 


Urine Ketones  20 mg/dL (NEGATIVE)  H  05/30/17  23:06    


 


Urine Blood  NEGATIVE  (NEGATIVE)   05/30/17  23:06    


 


Urine Nitrite  NEGATIVE  (NEGATIVE)   05/30/17  23:06    


 


Urine Bilirubin  NEGATIVE  (NEGATIVE)   05/30/17  23:06    


 


Urine Urobilinogen  NEGATIVE mg/dL (<2.0)   05/30/17  23:06    


 


Ur Leukocyte Esterase  NEGATIVE  (NEGATIVE)   05/30/17  23:06    


 


Urine WBC (Auto)  0 /HPF  05/30/17  23:06    


 


Urine RBC (Auto)  0 /HPF  05/30/17  23:06    


 


Urine Bacteria (Auto)  TRACE /HPF  05/30/17  23:06    


 


Squamous Epi Cells Auto  1 /HPF  05/30/17  23:06    


 


Urine Osmolality  113 mOsm/kg (300-900)  L  05/30/17  23:06    


 


Urine Sodium  25 mmol/L (30-90)  L  05/30/17  23:06    


 


Urine Ascorbic Acid  NEGATIVE  (NEGATIVE)   05/30/17  23:06    


 


Stool Occult Blood  NEGATIVE  (NEGATIVE)   05/31/17  02:50    


 


Urine Opiates Screen  NEGATIVE   05/30/17  23:06    


 


Urine Methadone Screen  NEGATIVE   05/30/17  23:06    


 


Ur Barbiturates Screen  NEGATIVE   05/30/17  23:06    


 


Ur Phencyclidine Scrn  NEGATIVE   05/30/17  23:06    


 


Ur Amphetamines Screen  NEGATIVE   05/30/17  23:06    


 


U Benzodiazepines Scrn  NEGATIVE   05/30/17  23:06    


 


Urine Cocaine Screen  UNCONFIRMED POSITIVE   05/30/17  23:06    


 


U Marijuana (THC) Screen  NEGATIVE   05/30/17  23:06    


 


Serum Alcohol  < 10 mg/dL (NONE DETECTED)   05/30/17  23:06    











Impressions: 


 





Abdomen/Pelvis CT  05/30/17 23:52


IMPRESSION:  1.  Small free pelvic fluid.  2.  Possible small right middle 

lobar pneumonia.


 














Qualifiers


**PATEINT BEING DISCHARGED WITH ANY OF THE FOLLOWING DIAGNOSIS?: No





Plan


Time Spent: Less than 30 Minutes

## 2017-05-31 NOTE — RADIOLOGY REPORT (SQ)
EXAM DESCRIPTION:  CT ABD/PELVIS WITH IV   ORAL



COMPLETED DATE/TIME:  5/31/2017 2:32 am



REASON FOR STUDY:  abdominal pain



COMPARISON:  None.



TECHNIQUE:  CT scan of the abdomen and pelvis performed using helical scanning technique with dynamic
 intravenous contrast injection.  No oral contrast. Images reviewed with lung, soft tissue, and bone 
windows. Reconstructed coronal and sagittal MPR images reviewed. Delayed images for evaluation of the
 urinary system also acquired. All images stored on PACS.

All CT scanners at this facility use dose modulation, iterative reconstruction, and/or weight based d
osing when appropriate to reduce radiation dose to as low as reasonably achievable (ALARA).

[facility]: Dose Right  [facility]: CareDose    [facility]: Dose Right    [facility]: Teradose 4D    [facility]: Smart Technologies



CONTRAST TYPE AND DOSE:  100mL Isovue 370- low osmolar.



RENAL FUNCTION:  None required. The patient is less than 50 years old.



RADIATION DOSE:  13.22mGy.



LIMITATIONS:  None.



FINDINGS:  LOWER CHEST: Small airspace patchiness of the right middle lobe.

LIVER: Normal size. No masses or dilated ducts.

SPLEEN: Normal size. No focal lesions.

PANCREAS: No masses. No significant calcifications. No adjacent inflammation or peripancreatic fluid 
collections. Pancreatic duct not dilated.

GALLBLADDER: No identified stones by CT criteria. No inflammatory changes to suggest cholecystitis.

ADRENAL GLANDS: No significant masses or asymmetry.

RIGHT KIDNEY AND URETER: No solid masses.   No significant calcifications.   No hydronephrosis or hyd
roureter.  Extrarenal pelvis.

LEFT KIDNEY AND URETER: No solid masses.   No significant calcifications.   No hydronephrosis or hydr
oureter.

AORTA AND VESSELS: No aneurysm. No dissection. Renal arteries, SMA, celiac without stenosis.

RETROPERITONEUM: No retroperitoneal adenopathy, hemorrhage or masses.

BOWEL AND PERITONEAL CAVITY: No masses or inflammatory changes. No free fluid or peritoneal masses.

APPENDIX: Normal.

PELVIS: Small free fluid in the right paracentral pelvis.

ABDOMINAL WALL: No masses. No hernias.

BONES: No significant or acute findings.

OTHER: No other significant finding.



IMPRESSION:  1.  Small free pelvic fluid.  2.  Possible small right middle lobar pneumonia.



TECHNICAL DOCUMENTATION:  JOB ID:  [id number]

Quality ID # 436: Final reports with documentation of one or more dose reduction techniques (e.g., Au
tomated exposure control, adjustment of the mA and/or kV according to patient size, use of iterative 
reconstruction technique)

 2011 [employer]- All Rights Reserved

## 2017-06-01 NOTE — EKG REPORT
SEVERITY:- BORDERLINE ECG -

SINUS RHYTHM

BORDERLINE T ABNORMALITIES, ANT-LAT LEADS

:

Confirmed by: Zelalem Mcleod 01-Jun-2017 09:56:54

## 2020-09-22 ENCOUNTER — HOSPITAL ENCOUNTER (OUTPATIENT)
Dept: HOSPITAL 62 - END | Age: 50
Discharge: HOME | End: 2020-09-22
Attending: INTERNAL MEDICINE
Payer: COMMERCIAL

## 2020-09-22 VITALS — SYSTOLIC BLOOD PRESSURE: 117 MMHG | DIASTOLIC BLOOD PRESSURE: 75 MMHG

## 2020-09-22 DIAGNOSIS — Z79.1: ICD-10-CM

## 2020-09-22 DIAGNOSIS — K92.1: ICD-10-CM

## 2020-09-22 DIAGNOSIS — R10.84: ICD-10-CM

## 2020-09-22 DIAGNOSIS — F32.9: ICD-10-CM

## 2020-09-22 DIAGNOSIS — Z03.818: ICD-10-CM

## 2020-09-22 DIAGNOSIS — F17.290: ICD-10-CM

## 2020-09-22 DIAGNOSIS — K29.50: Primary | ICD-10-CM

## 2020-09-22 DIAGNOSIS — K29.80: ICD-10-CM

## 2020-09-22 DIAGNOSIS — K63.3: ICD-10-CM

## 2020-09-22 DIAGNOSIS — Z85.828: ICD-10-CM

## 2020-09-22 DIAGNOSIS — Z83.79: ICD-10-CM

## 2020-09-22 DIAGNOSIS — K12.1: ICD-10-CM

## 2020-09-22 LAB — C DIFFICILE GDH: NEGATIVE

## 2020-09-22 PROCEDURE — 87324 CLOSTRIDIUM AG IA: CPT

## 2020-09-22 PROCEDURE — 87449 NOS EACH ORGANISM AG IA: CPT

## 2020-09-22 PROCEDURE — 88305 TISSUE EXAM BY PATHOLOGIST: CPT

## 2020-09-22 PROCEDURE — 43239 EGD BIOPSY SINGLE/MULTIPLE: CPT

## 2020-09-22 PROCEDURE — 45380 COLONOSCOPY AND BIOPSY: CPT

## 2020-09-22 NOTE — OPERATIVE REPORT
Operative Report


DATE OF SURGERY: 09/22/20


Operative Report: 





The risk, benefits and alternatives of the procedure including the risk of 

bleeding, perforation requiring surgery have been explained to the patient in 

detail and informed consent has been obtained.  Patient is placed in left 

lateral decubital position.  Propofol medication is administered.  A rectal 

examination is done which did not reveal any masses, tears or fissures.  An 

Olympus videoscope was introduced into the patient's rectum.  Scope was then car

efully advanced all the way to the cecum.  The cecum was identified by the usual

anatomical landmarks including the ileocecal valve as well as the appendiceal 

office.  Photodocumentation is obtained.  Scope was then sequentially pulled 

back via the various segments of the colon including the ascending colon, 

hepatic flexure, transverse colon, splenic flexure, descending colon and finally

into the rectosigmoid portions of the colon.  Retroflexion maneuvers performed.


The risks benefits and alternatives of the procedure explained to the patient in

detail and informed consent is obtained.A  GIF Olympus video scope was inserted 

into the patient's mouth and hypopharynx, the esophagus is identified intubated 

and insufflated, the scope was then advanced through the esophagus stomach and 

duodenum .retroflexion maneuver is done, the esophagus stomach and first and 

second portions of the duodenum examined


PREOPERATIVE DIAGNOSIS: Abdominal pain.  Change of bowel habits


POSTOPERATIVE DIAGNOSIS: Gastritis status post biopsy.  Colonic ulcers 

throughout the colon starting from the ileocecal valve all the way down to the 

rectum status post biopsy rule out Crohn's disease.  Possible superinfection 

with C. difficile stool sample was collected for confirmation


OPERATION: Colonoscopy with biopsy.  EGD with biopsy


SURGEON: JANIE ROACH


ANESTHESIA: LMAC


TISSUE REMOVED OR ALTERED: As noted above


COMPLICATIONS: 





None.


ESTIMATED BLOOD LOSS: None.


INTRAOPERATIVE FINDINGS: As noted above.


PROCEDURE: 





Patient tolerated the procedure well.


No immediate postprocedure complications are noted.


Patient is discharged in good condition.


Discharge date 9/22/2020.


Discharge diet: Regular.


Discharge activity: Regular.


2 to 3-week follow-up to discuss findings.


Patient is instructed to call the office or proceed to the emergency room should

there be any further problems or questions.


Wait on the pathology.

## 2020-09-25 ENCOUNTER — HOSPITAL ENCOUNTER (OUTPATIENT)
Dept: HOSPITAL 62 - OD | Age: 50
End: 2020-09-25
Attending: INTERNAL MEDICINE
Payer: COMMERCIAL

## 2020-09-25 DIAGNOSIS — K63.3: Primary | ICD-10-CM

## 2020-09-25 PROCEDURE — 86140 C-REACTIVE PROTEIN: CPT

## 2020-09-25 PROCEDURE — 36415 COLL VENOUS BLD VENIPUNCTURE: CPT

## 2020-09-25 PROCEDURE — 85652 RBC SED RATE AUTOMATED: CPT

## 2020-11-09 ENCOUNTER — HOSPITAL ENCOUNTER (OUTPATIENT)
Dept: HOSPITAL 62 - RAD | Age: 50
End: 2020-11-09
Attending: NURSE PRACTITIONER
Payer: COMMERCIAL

## 2020-11-09 DIAGNOSIS — R10.84: Primary | ICD-10-CM

## 2020-11-09 PROCEDURE — 74177 CT ABD & PELVIS W/CONTRAST: CPT

## 2020-11-09 NOTE — RADIOLOGY REPORT (SQ)
EXAM DESCRIPTION:  CT ABD/PELVIS WITH IV   ORAL



IMAGES COMPLETED DATE/TIME:  11/9/2020 2:28 pm



REASON FOR STUDY:  R10.84 GENERALIZED ABDOMINAL PAIN R10.84  GENERALIZED ABDOMINAL PAIN



COMPARISON:  5/31/2017



TECHNIQUE:  CT scan of the abdomen and pelvis performed using helical scanning technique with dynamic
 intravenous contrast injection.  No oral contrast. Images reviewed with lung, soft tissue, and bone 
windows. Reconstructed coronal and sagittal MPR images reviewed. Delayed images for evaluation of the
 urinary system also acquired. All images stored on PACS.

All CT scanners at this facility use dose modulation, iterative reconstruction, and/or weight based d
osing when appropriate to reduce radiation dose to as low as reasonably achievable (ALARA).

[facility]: Dose Right  [facility]: CareDose    [facility]: Dose Right    [facility]: Teradose 4D    [facility]: Smart Technologies



CONTRAST TYPE AND DOSE:  contrast/concentration: Isovue 350.00 mmol/ml; Total Contrast Delivered: 100
.0 ml; Total Saline Delivered: 65.0 ml



RENAL FUNCTION:  None required. The patient is less than 50 years old.



RADIATION DOSE:  CT Rad equipment meets quality standard of care and radiation dose reduction techniq
ues were employed. CTDIvol: 5.3 - 5.4 mGy. DLP: 526 mGy-cm..



LIMITATIONS:  None.



FINDINGS:  LOWER CHEST:  Focal atelectasis at the right lung base.

LIVER: Normal size. No masses.  No dilated ducts.  The hepatic and portal veins are patent.

SPLEEN: Normal size. No focal lesions.

PANCREAS: No masses. No significant calcifications. No adjacent inflammation or peripancreatic fluid 
collections. Pancreatic duct not dilated.

GALLBLADDER: No identified stones by CT criteria. No inflammatory changes to suggest cholecystitis.

ADRENAL GLANDS: No significant masses or asymmetry.

RIGHT KIDNEY AND URETER: No solid masses.   No significant calcifications.   No hydronephrosis or hyd
roureter.

LEFT KIDNEY AND URETER: No solid masses.   No significant calcifications.   No hydronephrosis or hydr
oureter.

AORTA AND VESSELS: No aneurysm. No dissection. Renal arteries, SMA, celiac without stenosis.

RETROPERITONEUM: No retroperitoneal adenopathy, hemorrhage or masses.

BOWEL AND PERITONEAL CAVITY:  The wall of the colon is mildly thickened, may be on the basis of incom
plete distention with oral contrast versus inflammatory changes.  No free fluid.

APPENDIX: Normal.

PELVIS:  The uterus is mildly prominent in appearance.  No free fluid.  The urinary bladder is incomp
letely distended.

ABDOMINAL WALL: No masses. No hernias.

BONES:  The osseous structures are stable in appearance.  No significant or acute findings.

OTHER: No other significant finding.



IMPRESSION:  1.  The wall of the colon is mildly thickened, may be on the basis of incomplete distent
ion with oral contrast versus inflammatory changes.  Correlation suggested.

2.  Uterus is mildly prominent in appearance.



TECHNICAL DOCUMENTATION:  JOB ID:  [id number]

Quality ID # 436: Final reports with documentation of one or more dose reduction techniques (e.g., Au
tomated exposure control, adjustment of the mA and/or kV according to patient size, use of iterative 
reconstruction technique)

 2011 [employer]- All Rights Reserved



Reading location - IP/workstation name: [email or web address]

## 2020-11-24 ENCOUNTER — HOSPITAL ENCOUNTER (OUTPATIENT)
Dept: HOSPITAL 62 - END | Age: 50
Discharge: HOME | End: 2020-11-24
Attending: INTERNAL MEDICINE
Payer: COMMERCIAL

## 2020-11-24 VITALS — DIASTOLIC BLOOD PRESSURE: 73 MMHG | SYSTOLIC BLOOD PRESSURE: 111 MMHG

## 2020-11-24 DIAGNOSIS — F32.9: ICD-10-CM

## 2020-11-24 DIAGNOSIS — Z85.828: ICD-10-CM

## 2020-11-24 DIAGNOSIS — K62.89: ICD-10-CM

## 2020-11-24 DIAGNOSIS — K63.3: Primary | ICD-10-CM

## 2020-11-24 DIAGNOSIS — Z87.891: ICD-10-CM

## 2020-11-24 DIAGNOSIS — Z79.899: ICD-10-CM

## 2020-11-24 DIAGNOSIS — R25.2: ICD-10-CM

## 2020-11-24 DIAGNOSIS — Z79.1: ICD-10-CM

## 2020-11-24 PROCEDURE — 45380 COLONOSCOPY AND BIOPSY: CPT

## 2020-11-24 PROCEDURE — 88305 TISSUE EXAM BY PATHOLOGIST: CPT

## 2020-11-24 NOTE — OPERATIVE REPORT
Operative Report


DATE OF SURGERY: 11/24/20


Operative Report: 





The risk, benefits and alternatives of the procedure including the risk of 

bleeding, perforation requiring surgery have been explained to the patient in 

detail and informed consent has been obtained.  Patient is placed in the left, 

lateral decubital position.  Timeout was called.  Propofol medication is 

administered.  Rectal examination is done which did not reveal any masses, tears

or fissures.  An Olympus videoscope was introduced into the patient's rectum and

subsequently insufflated the scope was then carefully guided all the way to the 

cecum.  Cecum was identified by the usual anatomical landmarks including the 

ileocecal valve as well as the appendiceal office.  Scope was then sequentially 

pulled back via the various segments of the colon including the ascending colon,

hepatic flexure, transverse colon, splenic flexure, descending colon finding to 

the rectosigmoid portions of the colon.  Retroflexion maneuver is performed.


PREOPERATIVE DIAGNOSIS: Follow-up of previous colonic ulcers, lab work and 

pathology were negative for Crohn's in the past.  There is a suspicion for NSAID

induced colonic ulcers


POSTOPERATIVE DIAGNOSIS: Previous ulcers noted IN the healing phase more 

granulation tissue is noted.


OPERATION: Colonoscopy with biopsy


SURGEON: JANIE ROACH


ANESTHESIA: LMAC


TISSUE REMOVED OR ALTERED: As noted above.


COMPLICATIONS: 





None.


ESTIMATED BLOOD LOSS: None.


INTRAOPERATIVE FINDINGS: As noted above.


PROCEDURE: 





Patient tolerated the procedure well.


No immediate postprocedure complications are noted.


Patient is discharged in good condition.


Discharge date 11/24/2020.


Discharge diet: Regular.


Discharge activity: Regular.


2 to 3-week follow-up to discuss findings.


Patient is instructed call the office or proceed to the emergency room should 

there be any further problems or questions.


Wait on the pathology.